# Patient Record
Sex: MALE | Race: WHITE
[De-identification: names, ages, dates, MRNs, and addresses within clinical notes are randomized per-mention and may not be internally consistent; named-entity substitution may affect disease eponyms.]

---

## 2018-05-25 ENCOUNTER — HOSPITAL ENCOUNTER (EMERGENCY)
Dept: HOSPITAL 42 - ED | Age: 48
LOS: 1 days | Discharge: HOME | End: 2018-05-26
Payer: MEDICAID

## 2018-05-25 VITALS — OXYGEN SATURATION: 100 % | RESPIRATION RATE: 18 BRPM

## 2018-05-25 VITALS — BODY MASS INDEX: 31.1 KG/M2

## 2018-05-25 DIAGNOSIS — E78.5: ICD-10-CM

## 2018-05-25 DIAGNOSIS — E11.9: ICD-10-CM

## 2018-05-25 DIAGNOSIS — I10: ICD-10-CM

## 2018-05-25 DIAGNOSIS — N13.2: Primary | ICD-10-CM

## 2018-05-25 LAB
ALBUMIN SERPL-MCNC: 4.5 G/DL (ref 3–4.8)
ALBUMIN/GLOB SERPL: 1.5 {RATIO} (ref 1.1–1.8)
ALT SERPL-CCNC: 43 U/L (ref 7–56)
APPEARANCE UR: CLEAR
AST SERPL-CCNC: 29 U/L (ref 17–59)
BASOPHILS # BLD AUTO: 0.02 K/MM3 (ref 0–2)
BASOPHILS NFR BLD: 0.2 % (ref 0–3)
BILIRUB UR-MCNC: NEGATIVE MG/DL
BUN SERPL-MCNC: 20 MG/DL (ref 7–21)
CALCIUM SERPL-MCNC: 9.8 MG/DL (ref 8.4–10.5)
COLOR UR: YELLOW
EOSINOPHIL # BLD: 0.2 10*3/UL (ref 0–0.7)
EOSINOPHIL NFR BLD: 1.7 % (ref 1.5–5)
EPI CELLS #/AREA URNS HPF: (no result) /HPF (ref 0–5)
ERYTHROCYTE [DISTWIDTH] IN BLOOD BY AUTOMATED COUNT: 13 % (ref 11.5–14.5)
GFR NON-AFRICAN AMERICAN: 59
GLUCOSE UR STRIP-MCNC: NEGATIVE MG/DL
GRANULOCYTES # BLD: 7.07 10*3/UL (ref 1.4–6.5)
GRANULOCYTES NFR BLD: 78.9 % (ref 50–68)
HGB BLD-MCNC: 14.7 G/DL (ref 14–18)
LEUKOCYTE ESTERASE UR-ACNC: NEGATIVE LEU/UL
LIPASE SERPL-CCNC: 53 U/L (ref 23–300)
LYMPHOCYTES # BLD: 1.2 10*3/UL (ref 1.2–3.4)
LYMPHOCYTES NFR BLD AUTO: 13.1 % (ref 22–35)
MCH RBC QN AUTO: 31.3 PG (ref 25–35)
MCHC RBC AUTO-ENTMCNC: 34.8 G/DL (ref 31–37)
MCV RBC AUTO: 90 FL (ref 80–105)
MONOCYTES # BLD AUTO: 0.6 10*3/UL (ref 0.1–0.6)
MONOCYTES NFR BLD: 6.1 % (ref 1–6)
PH UR STRIP: 5.5 [PH] (ref 4.7–8)
PLATELET # BLD: 238 10^3/UL (ref 120–450)
PMV BLD AUTO: 9.3 FL (ref 7–11)
PROT UR STRIP-MCNC: (no result) MG/DL
RBC # BLD AUTO: 4.7 10^6/UL (ref 3.5–6.1)
RBC # UR STRIP: (no result) /UL
RBC #/AREA URNS HPF: (no result) /HPF (ref 0–2)
SP GR UR STRIP: >= 1.03 (ref 1–1.03)
UROBILINOGEN UR STRIP-ACNC: 0.2 E.U./DL
WBC # BLD AUTO: 9 10^3/UL (ref 4.5–11)
WBC #/AREA URNS HPF: NEGATIVE /HPF (ref 0–6)

## 2018-05-25 PROCEDURE — 96361 HYDRATE IV INFUSION ADD-ON: CPT

## 2018-05-25 PROCEDURE — 74176 CT ABD & PELVIS W/O CONTRAST: CPT

## 2018-05-25 PROCEDURE — 96365 THER/PROPH/DIAG IV INF INIT: CPT

## 2018-05-25 PROCEDURE — 99284 EMERGENCY DEPT VISIT MOD MDM: CPT

## 2018-05-25 PROCEDURE — 81001 URINALYSIS AUTO W/SCOPE: CPT

## 2018-05-25 PROCEDURE — 96375 TX/PRO/DX INJ NEW DRUG ADDON: CPT

## 2018-05-25 PROCEDURE — 83690 ASSAY OF LIPASE: CPT

## 2018-05-25 PROCEDURE — 80053 COMPREHEN METABOLIC PANEL: CPT

## 2018-05-25 PROCEDURE — 85025 COMPLETE CBC W/AUTO DIFF WBC: CPT

## 2018-05-25 NOTE — CT
EXAM:

  CT Abdomen and Pelvis Without Intravenous Contrast



CLINICAL HISTORY:

  48 years old, male; Pain; Abdominal pain; Flank; Left; Additional info: Lt. 

Flank pain, history of renal stone?



TECHNIQUE:

  Axial computed tomography images of the abdomen and pelvis without 

intravenous contrast.  All CT scans at this facility use one or more dose 

reduction techniques, viz.: automated exposure control; ma/kV adjustment per 

patient size (including targeted exams where dose is matched to indication; 

i.e. head); or iterative reconstruction technique.

  Coronal and sagittal reformatted images were created and reviewed.



COMPARISON:

  No relevant prior studies available.



FINDINGS:

  Lung bases:  Unremarkable.  No mass.  No consolidation.



 ABDOMEN:

  Liver:  Unremarkable.

  Gallbladder and bile ducts:  Unremarkable.  No calcified stones.  No ductal 

dilation.

  Pancreas:  Unremarkable.  No ductal dilation.

  Spleen:  Unremarkable.  No splenomegaly.

  Adrenals:  Unremarkable.  No mass.

  Kidneys and ureters:  Tiny nonobstructing right renal calculi.  4.1 CM right 

renal cyst.  Tiny nonobstructing left renal calculi.  Mild left hydronephrosis. 

 Left perinephric stranding.  7 mm proximal left ureteral calculus.

  Stomach and bowel:  Unremarkable.  No obstruction.



 PELVIS:

  Appendix:  No findings to suggest acute appendicitis.

  Bladder:  Unremarkable.  No stones.

  Reproductive:  Unremarkable as visualized.



 ABDOMEN and PELVIS:

  Intraperitoneal space:  Unremarkable.  No free air.  No significant fluid 

collection.

  Bones/joints:  No acute fracture.  No dislocation.

  Soft tissues:  Unremarkable.

  Vasculature:  Atherosclerotic vascular disease.  No abdominal aortic aneurysm.

  Lymph nodes:  Unremarkable.  No enlarged lymph nodes.



IMPRESSION:     

1.  7 mm proximal left ureteral calculus with mild left hydronephrosis and 

perinephric stranding.

2.  Bilateral nephrolithiasis.

3.  Remainder of findings as above.

## 2018-05-25 NOTE — ED PDOC
Arrival/HPI





- General


Time Seen by Provider: 05/25/18 21:38


Historian: Patient





- History of Present Illness


Narrative History of Present Illness (Text): 





05/25/18 21:42


49 y/o male, pmh including htn/hyperlipidemia/dm/renal stone, c/o lt. flank 

pain x 3 hours with no fall or trauma.  Aching and sharp pain, radiating from 

lt. flank to the lt. suprapubic region, feels like his usual renal stone, no 

night sweat, no rash, no nausea or vomiting, no testicular pain, no numbness or 

tingling, no other medical or psychological complaints. 





Past Medical History





- Provider Review


Nursing Documentation Reviewed: Yes





- Cardiac


Hx Heart Murmur: Yes


Hx Hypertension: Yes





- Pulmonary


Hx Respiratory Disorders: Yes


Hx Sleep Apnea: Yes (C PAP SETTING 12)





- Neurological


Hx Neurological Disorder: No





- HEENT


Hx HEENT Disorder: Yes (GLASSES)





- Renal


Hx Renal Disorder: Yes


Hx Kidney Stones: Yes





- Endocrine/Metabolic


Hx Endocrine Disorders: Yes


Hx Diabetes Mellitus Type 2: Yes





- Hematological/Oncological


Hx Blood Disorders: No





- Integumentary


Hx Dermatological Disorder: No


Other/Comment: SOFT RAISED AREA NOTED LEFT FOREARM. PATIENT STATES DR. COULTER 

AWARE.WILL HAVE SURGERY ON THAT AREA NEXT.





- Musculoskeletal/Rheumatological


Hx Musculoskeletal Disorders: Yes


Hx Back Pain: Yes





- Gastrointestinal


Hx Gastrointestinal Disorders: No





- Genitourinary/Gynecological


Hx Genitourinary Disorders: No


Other/Comment: H/O KIDNEY STONES





- Psychiatric


Hx Psychophysiologic Disorder: No





- Surgical History


Other/Comment: CYSTOSCOPY STENT INSERTION REMOVAL LITHOTRIPSY





- Anesthesia


Hx Anesthesia: Yes


Hx Anesthesia Reactions: No


Hx Malignant Hyperthermia: No





Family/Social History





- Physician Review


Nursing Documentation Reviewed: Yes


Family/Social History: Unknown Family HX


Smoking Status: Never Smoked





Allergies/Home Meds


Allergies/Adverse Reactions: 


Allergies





No Known Allergies Allergy (Verified 10/02/14 11:17)


 








Home Medications: 


 Home Meds











 Medication  Instructions  Recorded  Confirmed


 


Amlodipine Besylate 5 mg PO DAILY 10/02/14 05/25/18


 


Metformin Hydrochloride [Metformin 500 mg PO BID 10/02/14 05/25/18





HCl]   


 


Simvastatin 40 mg PO DAILY 10/02/14 05/25/18














Review of Systems





- Review of Systems


Constitutional: absent: Fatigue


Eyes: absent: Vision Changes


ENT: absent: Hearing Changes


Respiratory: absent: SOB, Cough


Cardiovascular: absent: Chest Pain


Gastrointestinal: absent: Abdominal Pain, Diarrhea, Nausea, Vomiting


Genitourinary Male: absent: Dysuria, Frequency


Musculoskeletal: Back Pain.  absent: Arthralgias, Neck Pain, Joint Swelling, 

Myalgias


Skin: absent: Rash, Pruritis


Neurological: absent: Headache, Dizziness


Psychiatric: absent: Anxiety, Depression, Suicidal Ideation





Physical Exam


Vital Signs











  Temp Pulse Resp BP Pulse Ox


 


 05/25/18 21:50  98.4 F  89  18  129/71  100











Pain Distress: Moderate


Mental Status: Positive for: Alert and Oriented X 3





- Systems Exam


Head: Present: Atraumatic, Normocephalic


Pupils: Present: PERRL


Extroacular Muscles: Present: EOMI


Conjunctiva: Present: Normal


Mouth: Present: Moist Mucous Membranes


Neck: Present: Normal Range of Motion


Respiratory/Chest: Present: Clear to Auscultation, Good Air Exchange.  No: 

Respiratory Distress, Accessory Muscle Use


Cardiovascular: Present: Regular Rate and Rhythm, Normal S1, S2.  No: Murmurs


Abdomen: No: Tenderness, Distention, Peritoneal Signs, Rebound, Guarding


Back: Present: Normal Inspection, CVA Tenderness (lt. ), Other (no flank rash).

  No: Midline Tenderness, Paraspinal Tenderness, Pain with Leg Raise


Upper Extremity: Present: Normal Inspection.  No: Cyanosis, Edema


Lower Extremity: Present: Normal Inspection.  No: Edema


Neurological: Present: GCS=15, CN II-XII Intact, Speech Normal


Skin: Present: Warm, Dry, Normal Color.  No: Rashes


Psychiatric: Present: Alert, Oriented x 3, Normal Insight, Normal Concentration





Medical Decision Making


ED Course and Treatment: 





05/25/18 21:51


-Labs/ua


-CT abdomen and pelvis


-IVF/morphine/flomax


-Observe and reassess





05/25/18 23:46


-Labs show no acute findings


-UA show no UTI


-Pt. is DM, IV rocephine ordered for prophylatic.


-I reviewed the [facility] report, no signs of drug abuse, pt. has limited relief 

with tylenol and nsaids, will give percocet for 3 days. 


-I spoke to Dr. LIDYA Resendiz with labs/radiology results/treatment discussed and 

request to discharge the patient home with keflex and will see the patient 

tomorrow morning by calling his cellphone which I provided to the patient.  

Face sheet faxed to Dr. Resendiz as well.


-I discussed with Dr. Delvalle about this case and consults recommendation, he 

agreed that the patient can be discharged home to see DR. Resendiz tomorrow.


-Discharge home with keflex, flomax, percocet, strainer, call Dr. Resendiz 05/26/ 2018 morning for immediate follow up, follow up with your own pmd within 2 days

, return to the ER for any new or worsening signs or symptoms.





- Lab Interpretations


Lab Results: 








 05/25/18 22:55 





 05/25/18 22:55 





 Lab Results





05/25/18 23:05: Urine Color Yellow, Urine Appearance Clear, Urine pH 5.5, Ur 

Specific Gravity >= 1.030, Urine Protein Trace H, Urine Glucose (UA) Negative, 

Urine Ketones Negative, Urine Blood Large H, Urine Nitrate Negative, Urine 

Bilirubin Negative, Urine Urobilinogen 0.2, Ur Leukocyte Esterase Negative, 

Urine RBC 15 - 20, Urine WBC Negative, Ur Epithelial Cells None


05/25/18 22:55: WBC 9.0, RBC 4.70, Hgb 14.7, Hct 42.3, MCV 90.0, MCH 31.3, MCHC 

34.8, RDW 13.0, Plt Count 238, MPV 9.3, Gran % 78.9 H, Lymph % (Auto) 13.1 L, 

Mono % (Auto) 6.1 H, Eos % (Auto) 1.7, Baso % (Auto) 0.2, Gran # 7.07 H, Lymph 

# (Auto) 1.2, Mono # (Auto) 0.6, Eos # (Auto) 0.2, Baso # (Auto) 0.02


05/25/18 22:55: Sodium 145, Potassium 4.2, Chloride 101, Carbon Dioxide 29, 

Anion Gap 19, BUN 20, Creatinine 1.3, Est GFR (African Amer) > 60, Est GFR (Non-

Af Amer) 59, Random Glucose 103, Calcium 9.8, Total Bilirubin 0.5, AST 29, ALT 

43, Alkaline Phosphatase 69, Total Protein 7.5, Albumin 4.5, Globulin 3.1, 

Albumin/Globulin Ratio 1.5, Lipase 53








I have reviewed the lab results: Yes





- RAD Interpretation


Radiology Orders: 








05/25/18 22:05


ABD & PELVIS W/O PO OR IV CONT [CT] Stat 








 FINDINGS:  


   Lung bases:  Unremarkable.  No mass.  No consolidation.  


 


  ABDOMEN:  


   Liver:  Unremarkable.  


   Gallbladder and bile ducts:  Unremarkable.  No calcified stones.  No ductal 

  


 dilation.  


   Pancreas:  Unremarkable.  No ductal dilation.  


   Spleen:  Unremarkable.  No splenomegaly.  


   Adrenals:  Unremarkable.  No mass.  


   Kidneys and ureters:  Tiny nonobstructing right renal calculi.  4.1 CM right

   


 renal cyst.  Tiny nonobstructing left renal calculi.  Mild left 

hydronephrosis.   


  Left perinephric stranding.  7 mm proximal left ureteral calculus.  


   Stomach and bowel:  Unremarkable.  No obstruction.  


 


  PELVIS:  


   Appendix:  No findings to suggest acute appendicitis.  


   Bladder:  Unremarkable.  No stones.  


   Reproductive:  Unremarkable as visualized.  


 


  ABDOMEN and PELVIS:  


   Intraperitoneal space:  Unremarkable.  No free air.  No significant fluid   


 collection.  


   Bones/joints:  No acute fracture.  No dislocation.  


   Soft tissues:  Unremarkable.  


   Vasculature:  Atherosclerotic vascular disease.  No abdominal aortic 

aneurysm.  


   Lymph nodes:  Unremarkable.  No enlarged lymph nodes.  


 


 IMPRESSION:       


 1.  7 mm proximal left ureteral calculus with mild left hydronephrosis and   


 perinephric stranding.  


 2.  Bilateral nephrolithiasis.  


 3.  Remainder of findings as above.  


 


                                                            Dictated By:  Thomas Nicholas MD      


                                                            Dictated Date/Time:

  05/25/18 2316  


                                                            Signed By: Thomas Nicholas MD  


                                                            Date Signed: 05/25/ 18 2316  


                                                Transcribed By: ABIDA  


                                                            Transcribe Date/Time

: 05/25/18 2316  


: Radiologist





- Medication Orders


Current Medication Orders: 











Discontinued Medications





Sodium Chloride (Sodium Chloride 0.9%)  1,000 mls @ 999 mls/hr IV .Q1H1M STA


   Stop: 05/25/18 23:05


   Last Admin: 05/25/18 22:38  Dose: 999 mls/hr





eMAR Start Stop


 Document     05/25/18 22:38  SF  (Rec: 05/25/18 22:39  SF  [facility] [id number])


     Intravenous Solution


      Start Date                                 05/25/18


      Start Time                                 22:39


      End Date                                   05/25/18


      End time                                   23:40


      Total Infusion Time                        61





Morphine Sulfate (Morphine)  4 mg IVP STAT STA


   Stop: 05/25/18 22:06


   Last Admin: 05/25/18 22:38  Dose: 4 mg





MAR Pain Assessment


 Document     05/25/18 22:38  SF  (Rec: 05/25/18 22:38  [id number]


     Pain Reassessment


      Is this a pain reassessment?               Yes


     Sleep


      Is patient sleeping during reassessment?   No


     Presence of Pain


      Presence of Pain                           Yes


     Pain Scale Used


      Pain Scale Used                            Numeric


IVP Administration


 Document     05/25/18 22:38  SF  (Rec: 05/25/18 22:38  [facility]  [facility]


     Charges for Administration


      # of IVP Administrations                   1





Tamsulosin HCl (Flomax)  0.4 mg PO STAT STA


   Stop: 05/25/18 22:06


   Last Admin: 05/25/18 22:38  Dose: 0.4 mg











- PA / NP / Resident Statement


MD/DO has reviewed & agrees with the documentation as recorded.





Disposition/Present on Arrival





- Present on Arrival


Any Indicators Present on Arrival: No


History of DVT/PE: No


History of Uncontrolled Diabetes: No


Urinary Catheter: No


History of Decub. Ulcer: No





- Disposition


Have Diagnosis and Disposition been Completed?: Yes


Diagnosis: 


 Ureter colic, Hydronephrosis





Disposition: HOME/ ROUTINE


Disposition Time: 21:52


Patient Plan: Discharge


Condition: IMPROVED


Discharge Instructions (ExitCare):  Renal Colic, Hydronephrosis, Adult (DC)


Additional Instructions: 


-Discharge home with keflex, flomax, percocet, strainer, call Dr. Resendiz 05/26/ 2018 morning for immediate follow up, follow up with your own pmd within 2 days

, return to the ER for any new or worsening signs or symptoms.


Prescriptions: 


Cephalexin [Keflex] 500 mg PO TID #24 capsule


oxyCODONE/Acetaminophen [Percocet 5/325 mg Tab] 1 tab PO QID PRN #12 tab


 PRN Reason: Pain, Severe (8-10)


Tamsulosin [Flomax] 0.4 mg PO DAILY #7 cap


Referrals: 


Piedad Jean MD [Primary Care Provider] - Follow up with primary


David Resendiz MD [Staff Provider] - Follow up with primary


Forms:  WORK NOTE

## 2018-05-26 VITALS — HEART RATE: 77 BPM | TEMPERATURE: 98.2 F | DIASTOLIC BLOOD PRESSURE: 72 MMHG | SYSTOLIC BLOOD PRESSURE: 125 MMHG
